# Patient Record
Sex: MALE | Race: OTHER | URBAN - METROPOLITAN AREA
[De-identification: names, ages, dates, MRNs, and addresses within clinical notes are randomized per-mention and may not be internally consistent; named-entity substitution may affect disease eponyms.]

---

## 2020-02-17 ENCOUNTER — INPATIENT (INPATIENT)
Facility: HOSPITAL | Age: 50
LOS: 0 days | Discharge: LEFT BEFORE DISCHARGE | DRG: 337 | End: 2020-02-18
Attending: SURGERY | Admitting: SURGERY
Payer: SELF-PAY

## 2020-02-17 VITALS
DIASTOLIC BLOOD PRESSURE: 84 MMHG | RESPIRATION RATE: 18 BRPM | WEIGHT: 239.64 LBS | TEMPERATURE: 98 F | HEART RATE: 76 BPM | HEIGHT: 69 IN | SYSTOLIC BLOOD PRESSURE: 148 MMHG | OXYGEN SATURATION: 98 %

## 2020-02-17 DIAGNOSIS — Z98.890 OTHER SPECIFIED POSTPROCEDURAL STATES: Chronic | ICD-10-CM

## 2020-02-17 LAB
ALBUMIN SERPL ELPH-MCNC: 4.2 G/DL — SIGNIFICANT CHANGE UP (ref 3.3–5)
ALP SERPL-CCNC: 84 U/L — SIGNIFICANT CHANGE UP (ref 40–120)
ALT FLD-CCNC: 23 U/L — SIGNIFICANT CHANGE UP (ref 10–45)
ANION GAP SERPL CALC-SCNC: 11 MMOL/L — SIGNIFICANT CHANGE UP (ref 5–17)
APPEARANCE UR: CLEAR — SIGNIFICANT CHANGE UP
APTT BLD: 33.5 SEC — SIGNIFICANT CHANGE UP (ref 27.5–36.3)
AST SERPL-CCNC: 19 U/L — SIGNIFICANT CHANGE UP (ref 10–40)
BACTERIA # UR AUTO: PRESENT /HPF
BASOPHILS # BLD AUTO: 0.02 K/UL — SIGNIFICANT CHANGE UP (ref 0–0.2)
BASOPHILS NFR BLD AUTO: 0.3 % — SIGNIFICANT CHANGE UP (ref 0–2)
BILIRUB SERPL-MCNC: 0.6 MG/DL — SIGNIFICANT CHANGE UP (ref 0.2–1.2)
BILIRUB UR-MCNC: NEGATIVE — SIGNIFICANT CHANGE UP
BLD GP AB SCN SERPL QL: NEGATIVE — SIGNIFICANT CHANGE UP
BUN SERPL-MCNC: 11 MG/DL — SIGNIFICANT CHANGE UP (ref 7–23)
CALCIUM SERPL-MCNC: 8.7 MG/DL — SIGNIFICANT CHANGE UP (ref 8.4–10.5)
CHLORIDE SERPL-SCNC: 104 MMOL/L — SIGNIFICANT CHANGE UP (ref 96–108)
CO2 SERPL-SCNC: 22 MMOL/L — SIGNIFICANT CHANGE UP (ref 22–31)
COLOR SPEC: YELLOW — SIGNIFICANT CHANGE UP
CREAT SERPL-MCNC: 0.76 MG/DL — SIGNIFICANT CHANGE UP (ref 0.5–1.3)
DIFF PNL FLD: ABNORMAL
EOSINOPHIL # BLD AUTO: 0.16 K/UL — SIGNIFICANT CHANGE UP (ref 0–0.5)
EOSINOPHIL NFR BLD AUTO: 2 % — SIGNIFICANT CHANGE UP (ref 0–6)
EPI CELLS # UR: SIGNIFICANT CHANGE UP /HPF (ref 0–5)
GLUCOSE SERPL-MCNC: 115 MG/DL — HIGH (ref 70–99)
GLUCOSE UR QL: NEGATIVE — SIGNIFICANT CHANGE UP
HCT VFR BLD CALC: 43.3 % — SIGNIFICANT CHANGE UP (ref 39–50)
HGB BLD-MCNC: 14.4 G/DL — SIGNIFICANT CHANGE UP (ref 13–17)
IMM GRANULOCYTES NFR BLD AUTO: 0.5 % — SIGNIFICANT CHANGE UP (ref 0–1.5)
INR BLD: 1.08 — SIGNIFICANT CHANGE UP (ref 0.88–1.16)
KETONES UR-MCNC: NEGATIVE — SIGNIFICANT CHANGE UP
LACTATE SERPL-SCNC: 1.1 MMOL/L — SIGNIFICANT CHANGE UP (ref 0.5–2)
LEUKOCYTE ESTERASE UR-ACNC: NEGATIVE — SIGNIFICANT CHANGE UP
LYMPHOCYTES # BLD AUTO: 1.37 K/UL — SIGNIFICANT CHANGE UP (ref 1–3.3)
LYMPHOCYTES # BLD AUTO: 17.3 % — SIGNIFICANT CHANGE UP (ref 13–44)
MCHC RBC-ENTMCNC: 28.5 PG — SIGNIFICANT CHANGE UP (ref 27–34)
MCHC RBC-ENTMCNC: 33.3 GM/DL — SIGNIFICANT CHANGE UP (ref 32–36)
MCV RBC AUTO: 85.6 FL — SIGNIFICANT CHANGE UP (ref 80–100)
MONOCYTES # BLD AUTO: 0.73 K/UL — SIGNIFICANT CHANGE UP (ref 0–0.9)
MONOCYTES NFR BLD AUTO: 9.2 % — SIGNIFICANT CHANGE UP (ref 2–14)
NEUTROPHILS # BLD AUTO: 5.61 K/UL — SIGNIFICANT CHANGE UP (ref 1.8–7.4)
NEUTROPHILS NFR BLD AUTO: 70.7 % — SIGNIFICANT CHANGE UP (ref 43–77)
NITRITE UR-MCNC: NEGATIVE — SIGNIFICANT CHANGE UP
NRBC # BLD: 0 /100 WBCS — SIGNIFICANT CHANGE UP (ref 0–0)
PH UR: 6.5 — SIGNIFICANT CHANGE UP (ref 5–8)
PLATELET # BLD AUTO: 264 K/UL — SIGNIFICANT CHANGE UP (ref 150–400)
POTASSIUM SERPL-MCNC: 4.2 MMOL/L — SIGNIFICANT CHANGE UP (ref 3.5–5.3)
POTASSIUM SERPL-SCNC: 4.2 MMOL/L — SIGNIFICANT CHANGE UP (ref 3.5–5.3)
PROT SERPL-MCNC: 7.8 G/DL — SIGNIFICANT CHANGE UP (ref 6–8.3)
PROT UR-MCNC: ABNORMAL MG/DL
PROTHROM AB SERPL-ACNC: 12.4 SEC — SIGNIFICANT CHANGE UP (ref 10–12.9)
RBC # BLD: 5.06 M/UL — SIGNIFICANT CHANGE UP (ref 4.2–5.8)
RBC # FLD: 13.2 % — SIGNIFICANT CHANGE UP (ref 10.3–14.5)
RBC CASTS # UR COMP ASSIST: < 5 /HPF — SIGNIFICANT CHANGE UP
RH IG SCN BLD-IMP: NEGATIVE — SIGNIFICANT CHANGE UP
SODIUM SERPL-SCNC: 137 MMOL/L — SIGNIFICANT CHANGE UP (ref 135–145)
SP GR SPEC: 1.02 — SIGNIFICANT CHANGE UP (ref 1–1.03)
UROBILINOGEN FLD QL: 1 E.U./DL — SIGNIFICANT CHANGE UP
WBC # BLD: 7.93 K/UL — SIGNIFICANT CHANGE UP (ref 3.8–10.5)
WBC # FLD AUTO: 7.93 K/UL — SIGNIFICANT CHANGE UP (ref 3.8–10.5)
WBC UR QL: < 5 /HPF — SIGNIFICANT CHANGE UP

## 2020-02-17 PROCEDURE — 99285 EMERGENCY DEPT VISIT HI MDM: CPT

## 2020-02-17 PROCEDURE — 71045 X-RAY EXAM CHEST 1 VIEW: CPT | Mod: 26

## 2020-02-17 PROCEDURE — 74177 CT ABD & PELVIS W/CONTRAST: CPT | Mod: 26

## 2020-02-17 PROCEDURE — 88302 TISSUE EXAM BY PATHOLOGIST: CPT | Mod: 26

## 2020-02-17 PROCEDURE — 93010 ELECTROCARDIOGRAM REPORT: CPT

## 2020-02-17 RX ORDER — CEFOTETAN DISODIUM 1 G
2 VIAL (EA) INJECTION EVERY 12 HOURS
Refills: 0 | Status: DISCONTINUED | OUTPATIENT
Start: 2020-02-17 | End: 2020-02-18

## 2020-02-17 RX ORDER — SODIUM CHLORIDE 9 MG/ML
1000 INJECTION, SOLUTION INTRAVENOUS
Refills: 0 | Status: DISCONTINUED | OUTPATIENT
Start: 2020-02-17 | End: 2020-02-18

## 2020-02-17 RX ORDER — SODIUM CHLORIDE 9 MG/ML
1000 INJECTION, SOLUTION INTRAVENOUS
Refills: 0 | Status: DISCONTINUED | OUTPATIENT
Start: 2020-02-17 | End: 2020-02-17

## 2020-02-17 RX ORDER — ENOXAPARIN SODIUM 100 MG/ML
40 INJECTION SUBCUTANEOUS EVERY 24 HOURS
Refills: 0 | Status: DISCONTINUED | OUTPATIENT
Start: 2020-02-17 | End: 2020-02-17

## 2020-02-17 RX ORDER — KETOROLAC TROMETHAMINE 30 MG/ML
15 SYRINGE (ML) INJECTION EVERY 6 HOURS
Refills: 0 | Status: DISCONTINUED | OUTPATIENT
Start: 2020-02-17 | End: 2020-02-18

## 2020-02-17 RX ORDER — OXYCODONE HYDROCHLORIDE 5 MG/1
2.5 TABLET ORAL EVERY 4 HOURS
Refills: 0 | Status: DISCONTINUED | OUTPATIENT
Start: 2020-02-17 | End: 2020-02-18

## 2020-02-17 RX ORDER — ONDANSETRON 8 MG/1
4 TABLET, FILM COATED ORAL EVERY 6 HOURS
Refills: 0 | Status: DISCONTINUED | OUTPATIENT
Start: 2020-02-17 | End: 2020-02-18

## 2020-02-17 RX ORDER — ACETAMINOPHEN 500 MG
650 TABLET ORAL EVERY 4 HOURS
Refills: 0 | Status: DISCONTINUED | OUTPATIENT
Start: 2020-02-17 | End: 2020-02-18

## 2020-02-17 RX ORDER — METOCLOPRAMIDE HCL 10 MG
10 TABLET ORAL EVERY 6 HOURS
Refills: 0 | Status: DISCONTINUED | OUTPATIENT
Start: 2020-02-17 | End: 2020-02-18

## 2020-02-17 RX ORDER — MORPHINE SULFATE 50 MG/1
4 CAPSULE, EXTENDED RELEASE ORAL ONCE
Refills: 0 | Status: DISCONTINUED | OUTPATIENT
Start: 2020-02-17 | End: 2020-02-17

## 2020-02-17 RX ORDER — BUPIVACAINE 13.3 MG/ML
20 INJECTION, SUSPENSION, LIPOSOMAL INFILTRATION ONCE
Refills: 0 | Status: DISCONTINUED | OUTPATIENT
Start: 2020-02-17 | End: 2020-02-18

## 2020-02-17 RX ORDER — HYDROMORPHONE HYDROCHLORIDE 2 MG/ML
1 INJECTION INTRAMUSCULAR; INTRAVENOUS; SUBCUTANEOUS ONCE
Refills: 0 | Status: DISCONTINUED | OUTPATIENT
Start: 2020-02-17 | End: 2020-02-17

## 2020-02-17 RX ORDER — HYDROMORPHONE HYDROCHLORIDE 2 MG/ML
0.5 INJECTION INTRAMUSCULAR; INTRAVENOUS; SUBCUTANEOUS ONCE
Refills: 0 | Status: DISCONTINUED | OUTPATIENT
Start: 2020-02-17 | End: 2020-02-17

## 2020-02-17 RX ORDER — HEPARIN SODIUM 5000 [USP'U]/ML
5000 INJECTION INTRAVENOUS; SUBCUTANEOUS EVERY 8 HOURS
Refills: 0 | Status: DISCONTINUED | OUTPATIENT
Start: 2020-02-17 | End: 2020-02-18

## 2020-02-17 RX ORDER — SODIUM CHLORIDE 9 MG/ML
1000 INJECTION INTRAMUSCULAR; INTRAVENOUS; SUBCUTANEOUS ONCE
Refills: 0 | Status: COMPLETED | OUTPATIENT
Start: 2020-02-17 | End: 2020-02-17

## 2020-02-17 RX ORDER — OXYCODONE HYDROCHLORIDE 5 MG/1
5 TABLET ORAL EVERY 4 HOURS
Refills: 0 | Status: DISCONTINUED | OUTPATIENT
Start: 2020-02-17 | End: 2020-02-18

## 2020-02-17 RX ORDER — TAMSULOSIN HYDROCHLORIDE 0.4 MG/1
0.4 CAPSULE ORAL AT BEDTIME
Refills: 0 | Status: DISCONTINUED | OUTPATIENT
Start: 2020-02-17 | End: 2020-02-18

## 2020-02-17 RX ORDER — IOHEXOL 300 MG/ML
30 INJECTION, SOLUTION INTRAVENOUS ONCE
Refills: 0 | Status: COMPLETED | OUTPATIENT
Start: 2020-02-17 | End: 2020-02-17

## 2020-02-17 RX ADMIN — Medication 15 MILLIGRAM(S): at 23:30

## 2020-02-17 RX ADMIN — IOHEXOL 30 MILLILITER(S): 300 INJECTION, SOLUTION INTRAVENOUS at 10:22

## 2020-02-17 RX ADMIN — MORPHINE SULFATE 4 MILLIGRAM(S): 50 CAPSULE, EXTENDED RELEASE ORAL at 14:22

## 2020-02-17 RX ADMIN — OXYCODONE HYDROCHLORIDE 5 MILLIGRAM(S): 5 TABLET ORAL at 20:14

## 2020-02-17 RX ADMIN — Medication 15 MILLIGRAM(S): at 23:06

## 2020-02-17 RX ADMIN — Medication 650 MILLIGRAM(S): at 22:22

## 2020-02-17 RX ADMIN — HEPARIN SODIUM 5000 UNIT(S): 5000 INJECTION INTRAVENOUS; SUBCUTANEOUS at 21:37

## 2020-02-17 RX ADMIN — HYDROMORPHONE HYDROCHLORIDE 1 MILLIGRAM(S): 2 INJECTION INTRAMUSCULAR; INTRAVENOUS; SUBCUTANEOUS at 15:07

## 2020-02-17 RX ADMIN — Medication 650 MILLIGRAM(S): at 21:37

## 2020-02-17 RX ADMIN — TAMSULOSIN HYDROCHLORIDE 0.4 MILLIGRAM(S): 0.4 CAPSULE ORAL at 21:37

## 2020-02-17 RX ADMIN — HYDROMORPHONE HYDROCHLORIDE 0.5 MILLIGRAM(S): 2 INJECTION INTRAMUSCULAR; INTRAVENOUS; SUBCUTANEOUS at 19:35

## 2020-02-17 RX ADMIN — HYDROMORPHONE HYDROCHLORIDE 0.5 MILLIGRAM(S): 2 INJECTION INTRAMUSCULAR; INTRAVENOUS; SUBCUTANEOUS at 19:20

## 2020-02-17 RX ADMIN — SODIUM CHLORIDE 1000 MILLILITER(S): 9 INJECTION INTRAMUSCULAR; INTRAVENOUS; SUBCUTANEOUS at 10:22

## 2020-02-17 RX ADMIN — MORPHINE SULFATE 4 MILLIGRAM(S): 50 CAPSULE, EXTENDED RELEASE ORAL at 15:07

## 2020-02-17 RX ADMIN — HYDROMORPHONE HYDROCHLORIDE 1 MILLIGRAM(S): 2 INJECTION INTRAMUSCULAR; INTRAVENOUS; SUBCUTANEOUS at 14:38

## 2020-02-17 NOTE — ED PROVIDER NOTE - CPE EDP [facility] NORM
no vomiting/no blood in stool/no abdominal distension/no fever/no dysuria/no nausea/no hematuria/no burning urination/no diarrhea/no chills
normal...

## 2020-02-17 NOTE — ED PROVIDER NOTE - ATTENDING CONTRIBUTION TO CARE
50 y/o M with PMH of hernia, PSHx hernia surgery in 1997, presents to the ED c/o groin pain for the past several days. Pt believes he is having pain from his left inguinal hernia, as he experienced similar symptoms with his right inguinal hernia in the past. The hernia has persisted for the past year, but pt states he ignored it until the pain started a few days ago. Denies any fever, chills, nausea, vomiting, cp, sob, or urinary symptoms. No testicular pain. no further complaints. Pt AAO, some painful distress, (+) swelling and pain to left inguinal area, unable to reduce. VSS. labs noted. pain meds given. surgery consulted and unable to reduce hernia. ct scan done and noted. Pt admitted to surgery for OR. Stable in ED.

## 2020-02-17 NOTE — H&P ADULT - HISTORY OF PRESENT ILLNESS
HPI: 49 M 40pack yr former smoker, no other sig PMH, PSH R open inguinal hernia repair w/mesh (1997) p/w acute on chronic L groin pain x2 week. Pt reports having L sided inguinal hernia for the past year which has intermittently cause pain and nausea every few weeks, always able to be reduced by pt, until 2 months ago when pt reports pain has significantly increased and groin swelling has occurred every day after work. For the past 2 weeks pt reports not being able to reduce the hernia, and associated with increased LLQ pain daily, painful bowel movements, and urinary frequency. Per pt since last night he has had constant 9/10 LLQ pain and nausea, decreased appetite and presented to ED for further evaluation. He denies F/chills, denies emesis, passing flatus, reports having nonbloody BM every 1-2 days but with increasing pain (last BM yesterday afternoon), voiding well without dysuria/pyuria but with increased frequency. Denies personal/fam hx of IBD, Denies ever having cscope/egd    PMH:denies  PSH: R ing hernia repair w/mesh (1997-[place])  Meds: denies  Allergies: denies  Social: smokes marijuana multiple times daily for years, denies etoh use, smokes 40 pack yrs but has since quit last yr. Works in [employer]

## 2020-02-17 NOTE — H&P ADULT - NSHPSOCIALHISTORY_GEN_ALL_CORE
Social: smokes marijuana multiple times daily for years, denies etoh use, smokes 40 pack yrs but has since quit last yr. Works in [employer]

## 2020-02-17 NOTE — ED PROVIDER NOTE - OBJECTIVE STATEMENT
50 y/o M with PMHx hernia, PSHx hernia surgery in 1997, presents to the ED c/o groin pain for the past several days. Pt believes he currently has a left inguinal hernia, as he experienced similar symptoms with a right inguinal hernia in the past. The hernia has persisted for the past year, but pt states he ignored it until the pain started a few days ago. Denies any fever, chills, nausea, vomiting, cp, sob, or urinary symptoms. 50 y/o M with PMHx hernia, PSHx hernia surgery in 1997, presents to the ED c/o groin pain for the past several days. Pt believes he currently has a left inguinal hernia, as he experienced similar symptoms with a right inguinal hernia in the past. The hernia has persisted for the past year, but pt states he ignored it until the pain started a few days ago. Denies any fever, chills, nausea, vomiting, cp, sob, or urinary symptoms. No testicular pain. no further complaints.

## 2020-02-17 NOTE — H&P ADULT - NSHPPHYSICALEXAM_GEN_ALL_CORE
Vital Signs Last 24 Hrs  T(C): 36.6 (17 Feb 2020 13:54), Max: 36.6 (17 Feb 2020 09:21)  T(F): 97.8 (17 Feb 2020 13:54), Max: 97.8 (17 Feb 2020 09:21)  HR: 67 (17 Feb 2020 13:54) (67 - 76)  BP: 131/77 (17 Feb 2020 13:54) (131/77 - 148/84)  BP(mean): --  RR: 16 (17 Feb 2020 13:54) (16 - 18)  SpO2: 99% (17 Feb 2020 13:54) (98% - 99%)\    General: NAD, resting comfortably  Neuro: AAOX3, EOMI, PERRLA, no scleral icterus  Pulm: CTAB, Nonlabored breathing  CV: S1/S2 normal, no murmurs  Abdomen: soft, moderate distention, nontender, no rebound, no guarding  Groin: L 2 cm groin swelling extending into scrotal sac, severe tenderness to palpation, nonreducible, no skin changes, no erythema or discoloration  Extremities: WWP, No LE edema b/l

## 2020-02-17 NOTE — ED PROVIDER NOTE - GASTROINTESTINAL, MLM
Abdomen soft, non-tender, no guarding., no rebound. + reducibile non tendern umbilical hernia. + tenderness and hernia present to left inguinal region extending to scrotum. no testicular tenderness.

## 2020-02-17 NOTE — PROGRESS NOTE ADULT - ASSESSMENT
A/P: 49 M 40pack yr former smoker, no other sig PMH, PSH R open inguinal hernia repair w/mesh (1997) p/w nonreducible L inguinal hernia now s/p left inguinal hernia repair    CLD/IVF  Pain/nausea control  OOBA/IS  Lovenox/SCDs  AM labs A/P: 49 M 40pack yr former smoker, no other sig PMH, PSH R open inguinal hernia repair w/mesh (1997) p/w nonreducible L inguinal hernia now s/p left inguinal hernia repair    CLD/IVF  Cefotetan  Pain/nausea control  OOBA/IS  Lovenox/SCDs  AM labs

## 2020-02-17 NOTE — ED PROVIDER NOTE - CLINICAL SUMMARY MEDICAL DECISION MAKING FREE TEXT BOX
left inguinal hernia.  + tenderness on exam and unable to reduce. VSS. labs noted. pain meds given. surgery consulted and unable to reduce hernia. ct scan done and no strangulation of hernia. pt admitted to surgery for OR.

## 2020-02-17 NOTE — BRIEF OPERATIVE NOTE - OPERATION/FINDINGS
Upon induction of anesthesia the pt was placed in trendelenburg position and the left inguinal hernia was reduced. Proceeded with a totally extraperitoneal preperitoneal repair of a left inguinal hernia with mesh. Large left progrip mesh used. Two small rents in the peritoneum closed with endoloops. Subsequent diagnostic laparoscopy showing chronically scarred but viable sigmoid colon. Sharp adhesiolysis for interloop sigmoid adhesions. Umbilical hernia identified and closed primarily with Maxxon. Hemostasis achieved at the end of the case. Upon induction of anesthesia the pt was placed in trendelenburg position and the left inguinal hernia was reduced. Proceeded with a totally extraperitoneal preperitoneal repair of a large left indirect inguinal hernia with mesh. Large left progrip mesh used. Two small rents in the peritoneum closed with endoloops. Subsequent diagnostic laparoscopy showing chronically scarred but viable sigmoid colon. Sharp adhesiolysis for interloop sigmoid adhesions. Umbilical hernia identified and closed primarily with Maxxon. Hemostasis achieved at the end of the case.

## 2020-02-17 NOTE — ED ADULT NURSE NOTE - CHPI ED NUR SYMPTOMS NEG
no nausea/no dysuria/no vomiting/no blood in stool/no hematuria/no burning urination/no chills/no fever/no diarrhea/no abdominal distension

## 2020-02-17 NOTE — ED ADULT NURSE NOTE - OBJECTIVE STATEMENT
49y M, A&ox3, presents to ed for left groin pain for months, reports worsening last few days. Reports hx of right inguinal hernia with surgery in 1997. pt states "I might need surgery again" Hernia to left groin. reports able to pass stool, reports nonbloody, episodes of painful defecation per pt intermittent for months. Denies fevers nor chills. no n/v. no urinary s/s. No cp, no sob. IV placed, lab drawn. Will continue to monitor.

## 2020-02-17 NOTE — PACU DISCHARGE NOTE - COMMENTS
pt met criteria for discharge- awake-alert and oriented x4- follow commands and moving all extremities- s.p hernia repair with 3 lap sites with steri-strips- tolerating clear liquid- whyte patent and draining -Pain management in progress- report given to NAN Peng-pt left unit via bed and supplemental oxygen to 843.1

## 2020-02-17 NOTE — H&P ADULT - NSHPLABSRESULTS_GEN_ALL_CORE
14.4   7.93  )-----------( 264      ( 17 Feb 2020 10:19 )             43.3     02-17    137  |  104  |  11  ----------------------------<  115<H>  4.2   |  22  |  0.76    Ca    8.7      17 Feb 2020 10:19    TPro  7.8  /  Alb  4.2  /  TBili  0.6  /  DBili  x   /  AST  19  /  ALT  23  /  AlkPhos  84  02-17      < from: CT Abdomen and Pelvis w/ Oral Cont and w/ IV Cont (02.17.20 @ 13:35) >    INTERPRETATION:  CT SCAN OF ABDOMEN AND PELVIS    History: Left inguinal hernia.    Technique: CT scan of abdomen and pelvis was performed from lung bases through symphysis pubis. Intravenous and oral contrast material were utilized. Axial, sagittal and coronal reformatted images were reviewed.    Comparison: None.    Findings:     Lower chest: Normal.     Liver:  Hepatic steatosis and hepatomegaly with the right liver lobe measuring 21.7 cm.    Gallbladder: No radiopaque stones gallbladder.    Spleen:  Normal.    Pancreas:  Normal.    Adrenal glands:  Normal.    Kidneys: 1.8 cm right renal simple cyst and 2.0 cm left lower pole exophytic simplerenal cyst. There is a nonobstructive calculus in the left kidney lower pole measuring 8 mm.    Adenopathy:  No lymphadenopathy in abdomen or pelvis.    Ascites: None.    Gastrointestinal tract: There is a small fat-containing periumbilical hernia. There is also a sigmoid colon containing left-sided inguinal hernia. No significant fat plane stranding or wall thickening. There is moderate diverticulosis most pronounced in the rectosigmoid colon region. No evidence of obstruction, free air or ascites.    Vessels: Mild atherosclerotic calcifications of the aortoiliac system.    Pelvic organs: Normal.    Soft tissues: Above-described periumbilical and left-sided inguinal hernia.    Bones: Mild degenerative disease of the spine.    Impression:     Left-sided uncomplicated bowel containing inguinal hernia as well as uncomplicated fat-containing periumbilical hernia.    2 cm cyst involving the lower pole of the left kidney demonstrating complex fluid attenuation possibly proteinaceous and/or hemorrhagic. Confirmation with a targeted renal ultrasound examination may be of value.    Hepatic steatosis and hepatomegaly.        < end of copied text >

## 2020-02-17 NOTE — ED ADULT NURSE NOTE - NSIMPLEMENTINTERV_GEN_ALL_ED
Implemented All Universal Safety Interventions:  [place] to call system. Call bell, personal items and telephone within reach. Instruct patient to call for assistance. Room bathroom lighting operational. Non-slip footwear when patient is off stretcher. Physically safe environment: no spills, clutter or unnecessary equipment. Stretcher in lowest position, wheels locked, appropriate side rails in place.

## 2020-02-17 NOTE — PROGRESS NOTE ADULT - SUBJECTIVE AND OBJECTIVE BOX
POST-OPERATIVE NOTE    Procedure: L inguinal hernia repair    Diagnosis/Indication: L inguinal hernia    Surgeon: Dr. Kessler    S: C/o some abd soreness, pain controlled. Denies CP, SOB, calf tenderness. Pain controlled with medication. Denies nausea, vomiting.    O:  T(C): 36.6 (02-17-20 @ 20:47), Max: 36.6 (02-17-20 @ 20:47)  T(F): 97.9 (02-17-20 @ 20:47), Max: 97.9 (02-17-20 @ 20:47)  HR: 63 (02-17-20 @ 20:47) (59 - 82)  BP: 133/71 (02-17-20 @ 20:47) (122/64 - 156/89)  RR: 17 (02-17-20 @ 20:47) (14 - 25)  SpO2: 95% (02-17-20 @ 20:47) (92% - 96%)  Wt(kg): --                        14.4   7.93  )-----------( 264      ( 17 Feb 2020 10:19 )             43.3     02-17    137  |  104  |  11  ----------------------------<  115<H>  4.2   |  22  |  0.76    Ca    8.7      17 Feb 2020 10:19    TPro  7.8  /  Alb  4.2  /  TBili  0.6  /  DBili  x   /  AST  19  /  ALT  23  /  AlkPhos  84  02-17      Gen: NAD, resting comfortably in bed  C/V: NSR  Pulm: Nonlabored breathing, no respiratory distress  Abd: soft, NT/ND, incisions CDI  Extrem: no calf tenderness, SCDs in place

## 2020-02-18 VITALS
DIASTOLIC BLOOD PRESSURE: 70 MMHG | HEART RATE: 69 BPM | SYSTOLIC BLOOD PRESSURE: 110 MMHG | OXYGEN SATURATION: 97 % | TEMPERATURE: 98 F | RESPIRATION RATE: 17 BRPM

## 2020-02-18 LAB
ANION GAP SERPL CALC-SCNC: 12 MMOL/L — SIGNIFICANT CHANGE UP (ref 5–17)
BUN SERPL-MCNC: 12 MG/DL — SIGNIFICANT CHANGE UP (ref 7–23)
CALCIUM SERPL-MCNC: 8.4 MG/DL — SIGNIFICANT CHANGE UP (ref 8.4–10.5)
CHLORIDE SERPL-SCNC: 102 MMOL/L — SIGNIFICANT CHANGE UP (ref 96–108)
CO2 SERPL-SCNC: 21 MMOL/L — LOW (ref 22–31)
CREAT SERPL-MCNC: 0.76 MG/DL — SIGNIFICANT CHANGE UP (ref 0.5–1.3)
GLUCOSE SERPL-MCNC: 142 MG/DL — HIGH (ref 70–99)
HCT VFR BLD CALC: 39.4 % — SIGNIFICANT CHANGE UP (ref 39–50)
HGB BLD-MCNC: 13.1 G/DL — SIGNIFICANT CHANGE UP (ref 13–17)
MAGNESIUM SERPL-MCNC: 1.8 MG/DL — SIGNIFICANT CHANGE UP (ref 1.6–2.6)
MCHC RBC-ENTMCNC: 28.7 PG — SIGNIFICANT CHANGE UP (ref 27–34)
MCHC RBC-ENTMCNC: 33.2 GM/DL — SIGNIFICANT CHANGE UP (ref 32–36)
MCV RBC AUTO: 86.2 FL — SIGNIFICANT CHANGE UP (ref 80–100)
NRBC # BLD: 0 /100 WBCS — SIGNIFICANT CHANGE UP (ref 0–0)
PHOSPHATE SERPL-MCNC: 3.3 MG/DL — SIGNIFICANT CHANGE UP (ref 2.5–4.5)
PLATELET # BLD AUTO: 267 K/UL — SIGNIFICANT CHANGE UP (ref 150–400)
POTASSIUM SERPL-MCNC: 4.1 MMOL/L — SIGNIFICANT CHANGE UP (ref 3.5–5.3)
POTASSIUM SERPL-SCNC: 4.1 MMOL/L — SIGNIFICANT CHANGE UP (ref 3.5–5.3)
RBC # BLD: 4.57 M/UL — SIGNIFICANT CHANGE UP (ref 4.2–5.8)
RBC # FLD: 13.2 % — SIGNIFICANT CHANGE UP (ref 10.3–14.5)
SODIUM SERPL-SCNC: 135 MMOL/L — SIGNIFICANT CHANGE UP (ref 135–145)
WBC # BLD: 14.18 K/UL — HIGH (ref 3.8–10.5)
WBC # FLD AUTO: 14.18 K/UL — HIGH (ref 3.8–10.5)

## 2020-02-18 RX ORDER — TAMSULOSIN HYDROCHLORIDE 0.4 MG/1
1 CAPSULE ORAL
Qty: 30 | Refills: 1
Start: 2020-02-18 | End: 2020-04-17

## 2020-02-18 RX ADMIN — OXYCODONE HYDROCHLORIDE 5 MILLIGRAM(S): 5 TABLET ORAL at 06:30

## 2020-02-18 RX ADMIN — Medication 15 MILLIGRAM(S): at 06:00

## 2020-02-18 RX ADMIN — Medication 650 MILLIGRAM(S): at 06:30

## 2020-02-18 RX ADMIN — HEPARIN SODIUM 5000 UNIT(S): 5000 INJECTION INTRAVENOUS; SUBCUTANEOUS at 05:36

## 2020-02-18 RX ADMIN — OXYCODONE HYDROCHLORIDE 5 MILLIGRAM(S): 5 TABLET ORAL at 05:37

## 2020-02-18 RX ADMIN — Medication 650 MILLIGRAM(S): at 01:58

## 2020-02-18 RX ADMIN — OXYCODONE HYDROCHLORIDE 5 MILLIGRAM(S): 5 TABLET ORAL at 01:58

## 2020-02-18 RX ADMIN — Medication 100 GRAM(S): at 05:40

## 2020-02-18 RX ADMIN — Medication 650 MILLIGRAM(S): at 05:37

## 2020-02-18 RX ADMIN — Medication 15 MILLIGRAM(S): at 05:36

## 2020-02-18 RX ADMIN — Medication 650 MILLIGRAM(S): at 00:58

## 2020-02-18 RX ADMIN — OXYCODONE HYDROCHLORIDE 5 MILLIGRAM(S): 5 TABLET ORAL at 00:58

## 2020-02-18 NOTE — PROGRESS NOTE ADULT - ASSESSMENT
A/P: 49 M 40pack yr former smoker, no other sig PMH, PSH R open inguinal hernia repair w/mesh (1997) p/w nonreducible L inguinal hernia now s/p left inguinal hernia repair    CLD/IVF  Cefotetan  [place]  Pain/nausea control  OOBA/IS  Lovenox/SCDs  AM labs

## 2020-02-18 NOTE — CHART NOTE - NSCHARTNOTEFT_GEN_A_CORE
Patient is leaving AMA.     Patient educated on the risks and benefits of signing out against medical advice.    Patient instructed not to do any heavy lifting.  Prescription for flomax sent to the pharmacy.

## 2020-02-18 NOTE — PROGRESS NOTE ADULT - SUBJECTIVE AND OBJECTIVE BOX
POD: 1  Procedure: L inguinal hernia    SUBJECTIVE: Patient seen and examined by chief resident on morning rounds.  Pt states that he has been passing gas. Pain is controlled.      Vital Signs Last 24 Hrs  T(C): 36.3 (2020 05:08), Max: 36.6 (2020 09:21)  T(F): 97.4 (2020 05:08), Max: 97.9 (2020 20:47)  HR: 69 (2020 05:08) (59 - 83)  BP: 123/65 (2020 05:08) (119/74 - 156/89)  BP(mean): 94 (2020 20:20) (87 - 116)  RR: 18 (2020 05:08) (14 - 25)  SpO2: 96% (2020 05:08) (92% - 99%)    Physical exam:  Gen: NAD, resting comfortably in bed  C/V: NSR  Pulm: Nonlabored breathing, no respiratory distress  Abd: soft, NT/ND, incisions CDI  Extrem: no calf tenderness, SCDs in place    Lines/drains/tubes:    I&O's Summary    2020 07:01  -  2020 07:00  --------------------------------------------------------  IN: 1940 mL / OUT: 1200 mL / NET: 740 mL        LABS:                        13.1   14.18 )-----------( 267      ( 2020 06:30 )             39.4     02-18    135  |  102  |  12  ----------------------------<  142<H>  4.1   |  21<L>  |  0.76    Ca    8.4      2020 06:30  Phos  3.3     02-18  Mg     1.8     18    TPro  7.8  /  Alb  4.2  /  TBili  0.6  /  DBili  x   /  AST  19  /  ALT  23  /  AlkPhos  84  02-17    PT/INR - ( 2020 10:19 )   PT: 12.4 sec;   INR: 1.08          PTT - ( 2020 10:19 )  PTT:33.5 sec  Urinalysis Basic - ( 2020 10:31 )    Color: Yellow / Appearance: Clear / S.025 / pH: x  Gluc: x / Ketone: NEGATIVE  / Bili: Negative / Urobili: 1.0 E.U./dL   Blood: x / Protein: Trace mg/dL / Nitrite: NEGATIVE   Leuk Esterase: NEGATIVE / RBC: < 5 /HPF / WBC < 5 /HPF   Sq Epi: x / Non Sq Epi: 0-5 /HPF / Bacteria: Present /HPF      CAPILLARY BLOOD GLUCOSE        LIVER FUNCTIONS - ( 2020 10:19 )  Alb: 4.2 g/dL / Pro: 7.8 g/dL / ALK PHOS: 84 U/L / ALT: 23 U/L / AST: 19 U/L / GGT: x             RADIOLOGY & ADDITIONAL STUDIES:

## 2020-02-21 DIAGNOSIS — K40.30 UNILATERAL INGUINAL HERNIA, WITH OBSTRUCTION, WITHOUT GANGRENE, NOT SPECIFIED AS RECURRENT: ICD-10-CM

## 2020-02-21 DIAGNOSIS — K46.9 UNSPECIFIED ABDOMINAL HERNIA WITHOUT OBSTRUCTION OR GANGRENE: ICD-10-CM

## 2020-02-21 DIAGNOSIS — K42.9 UMBILICAL HERNIA WITHOUT OBSTRUCTION OR GANGRENE: ICD-10-CM

## 2020-02-21 DIAGNOSIS — Z87.891 PERSONAL HISTORY OF NICOTINE DEPENDENCE: ICD-10-CM

## 2020-02-24 LAB — SURGICAL PATHOLOGY STUDY: SIGNIFICANT CHANGE UP

## 2020-02-24 PROCEDURE — 74177 CT ABD & PELVIS W/CONTRAST: CPT

## 2020-02-24 PROCEDURE — 84100 ASSAY OF PHOSPHORUS: CPT

## 2020-02-24 PROCEDURE — 83735 ASSAY OF MAGNESIUM: CPT

## 2020-02-24 PROCEDURE — 85025 COMPLETE CBC W/AUTO DIFF WBC: CPT

## 2020-02-24 PROCEDURE — 83605 ASSAY OF LACTIC ACID: CPT

## 2020-02-24 PROCEDURE — 86901 BLOOD TYPING SEROLOGIC RH(D): CPT

## 2020-02-24 PROCEDURE — 96374 THER/PROPH/DIAG INJ IV PUSH: CPT | Mod: XU

## 2020-02-24 PROCEDURE — 80048 BASIC METABOLIC PNL TOTAL CA: CPT

## 2020-02-24 PROCEDURE — 85730 THROMBOPLASTIN TIME PARTIAL: CPT

## 2020-02-24 PROCEDURE — 86850 RBC ANTIBODY SCREEN: CPT

## 2020-02-24 PROCEDURE — 88302 TISSUE EXAM BY PATHOLOGIST: CPT

## 2020-02-24 PROCEDURE — 85027 COMPLETE CBC AUTOMATED: CPT

## 2020-02-24 PROCEDURE — 80053 COMPREHEN METABOLIC PANEL: CPT

## 2020-02-24 PROCEDURE — 99285 EMERGENCY DEPT VISIT HI MDM: CPT | Mod: 25

## 2020-02-24 PROCEDURE — 71045 X-RAY EXAM CHEST 1 VIEW: CPT

## 2020-02-24 PROCEDURE — 81001 URINALYSIS AUTO W/SCOPE: CPT

## 2020-02-24 PROCEDURE — C1781: CPT

## 2020-02-24 PROCEDURE — 93005 ELECTROCARDIOGRAM TRACING: CPT

## 2020-02-24 PROCEDURE — 96375 TX/PRO/DX INJ NEW DRUG ADDON: CPT

## 2020-02-24 PROCEDURE — 85610 PROTHROMBIN TIME: CPT

## 2020-02-24 PROCEDURE — 36415 COLL VENOUS BLD VENIPUNCTURE: CPT
